# Patient Record
Sex: FEMALE | NOT HISPANIC OR LATINO | URBAN - METROPOLITAN AREA
[De-identification: names, ages, dates, MRNs, and addresses within clinical notes are randomized per-mention and may not be internally consistent; named-entity substitution may affect disease eponyms.]

---

## 2023-07-06 ENCOUNTER — INPATIENT (INPATIENT)
Facility: HOSPITAL | Age: 73
LOS: 1 days | Discharge: HOME | DRG: 563 | End: 2023-07-08
Attending: STUDENT IN AN ORGANIZED HEALTH CARE EDUCATION/TRAINING PROGRAM | Admitting: STUDENT IN AN ORGANIZED HEALTH CARE EDUCATION/TRAINING PROGRAM
Payer: COMMERCIAL

## 2023-07-06 VITALS
RESPIRATION RATE: 20 BRPM | TEMPERATURE: 98 F | SYSTOLIC BLOOD PRESSURE: 144 MMHG | HEART RATE: 72 BPM | WEIGHT: 110.01 LBS | OXYGEN SATURATION: 96 % | HEIGHT: 62 IN | DIASTOLIC BLOOD PRESSURE: 83 MMHG

## 2023-07-06 DIAGNOSIS — S42.109A FRACTURE OF UNSPECIFIED PART OF SCAPULA, UNSPECIFIED SHOULDER, INITIAL ENCOUNTER FOR CLOSED FRACTURE: ICD-10-CM

## 2023-07-06 LAB
ALBUMIN SERPL ELPH-MCNC: 3.8 G/DL — SIGNIFICANT CHANGE UP (ref 3.3–5)
ALP SERPL-CCNC: 48 U/L — SIGNIFICANT CHANGE UP (ref 40–120)
ALT FLD-CCNC: 14 U/L — SIGNIFICANT CHANGE UP (ref 10–45)
ANION GAP SERPL CALC-SCNC: 11 MMOL/L — SIGNIFICANT CHANGE UP (ref 5–17)
APPEARANCE UR: CLEAR — SIGNIFICANT CHANGE UP
APTT BLD: 27.5 SEC — SIGNIFICANT CHANGE UP (ref 27.5–35.5)
AST SERPL-CCNC: 24 U/L — SIGNIFICANT CHANGE UP (ref 10–40)
BASE EXCESS BLDV CALC-SCNC: 1.5 MMOL/L — SIGNIFICANT CHANGE UP (ref -2–3)
BASOPHILS # BLD AUTO: 0.05 K/UL — SIGNIFICANT CHANGE UP (ref 0–0.2)
BASOPHILS NFR BLD AUTO: 0.6 % — SIGNIFICANT CHANGE UP (ref 0–2)
BILIRUB SERPL-MCNC: 0.5 MG/DL — SIGNIFICANT CHANGE UP (ref 0.2–1.2)
BILIRUB UR-MCNC: NEGATIVE — SIGNIFICANT CHANGE UP
BUN SERPL-MCNC: 19 MG/DL — SIGNIFICANT CHANGE UP (ref 7–23)
CA-I SERPL-SCNC: 1.16 MMOL/L — SIGNIFICANT CHANGE UP (ref 1.15–1.33)
CALCIUM SERPL-MCNC: 8.7 MG/DL — SIGNIFICANT CHANGE UP (ref 8.4–10.5)
CHLORIDE BLDV-SCNC: 107 MMOL/L — SIGNIFICANT CHANGE UP (ref 96–108)
CHLORIDE SERPL-SCNC: 106 MMOL/L — SIGNIFICANT CHANGE UP (ref 96–108)
CO2 BLDV-SCNC: 25 MMOL/L — SIGNIFICANT CHANGE UP (ref 22–26)
CO2 SERPL-SCNC: 23 MMOL/L — SIGNIFICANT CHANGE UP (ref 22–31)
COLOR SPEC: SIGNIFICANT CHANGE UP
CREAT SERPL-MCNC: 0.57 MG/DL — SIGNIFICANT CHANGE UP (ref 0.5–1.3)
DIFF PNL FLD: NEGATIVE — SIGNIFICANT CHANGE UP
EGFR: 98 ML/MIN/1.73M2 — SIGNIFICANT CHANGE UP
EOSINOPHIL # BLD AUTO: 0.02 K/UL — SIGNIFICANT CHANGE UP (ref 0–0.5)
EOSINOPHIL NFR BLD AUTO: 0.3 % — SIGNIFICANT CHANGE UP (ref 0–6)
GAS PNL BLDV: 136 MMOL/L — SIGNIFICANT CHANGE UP (ref 136–145)
GAS PNL BLDV: SIGNIFICANT CHANGE UP
GLUCOSE BLDV-MCNC: 104 MG/DL — HIGH (ref 70–99)
GLUCOSE SERPL-MCNC: 112 MG/DL — HIGH (ref 70–99)
GLUCOSE UR QL: NEGATIVE — SIGNIFICANT CHANGE UP
HCO3 BLDV-SCNC: 24 MMOL/L — SIGNIFICANT CHANGE UP (ref 22–29)
HCT VFR BLD CALC: 33.9 % — LOW (ref 34.5–45)
HCT VFR BLDA CALC: 33 % — LOW (ref 34.5–46.5)
HGB BLD CALC-MCNC: 11.1 G/DL — LOW (ref 11.7–16.1)
HGB BLD-MCNC: 11.2 G/DL — LOW (ref 11.5–15.5)
IMM GRANULOCYTES NFR BLD AUTO: 0.8 % — SIGNIFICANT CHANGE UP (ref 0–0.9)
INR BLD: 1.13 RATIO — SIGNIFICANT CHANGE UP (ref 0.88–1.16)
KETONES UR-MCNC: ABNORMAL
LACTATE BLDV-MCNC: 1 MMOL/L — SIGNIFICANT CHANGE UP (ref 0.5–2)
LACTATE SERPL-SCNC: 0.9 MMOL/L — SIGNIFICANT CHANGE UP (ref 0.5–2)
LEUKOCYTE ESTERASE UR-ACNC: NEGATIVE — SIGNIFICANT CHANGE UP
LIDOCAIN IGE QN: 62 U/L — HIGH (ref 7–60)
LYMPHOCYTES # BLD AUTO: 1.21 K/UL — SIGNIFICANT CHANGE UP (ref 1–3.3)
LYMPHOCYTES # BLD AUTO: 15.7 % — SIGNIFICANT CHANGE UP (ref 13–44)
MCHC RBC-ENTMCNC: 31.9 PG — SIGNIFICANT CHANGE UP (ref 27–34)
MCHC RBC-ENTMCNC: 33 GM/DL — SIGNIFICANT CHANGE UP (ref 32–36)
MCV RBC AUTO: 96.6 FL — SIGNIFICANT CHANGE UP (ref 80–100)
MONOCYTES # BLD AUTO: 0.37 K/UL — SIGNIFICANT CHANGE UP (ref 0–0.9)
MONOCYTES NFR BLD AUTO: 4.8 % — SIGNIFICANT CHANGE UP (ref 2–14)
NEUTROPHILS # BLD AUTO: 6 K/UL — SIGNIFICANT CHANGE UP (ref 1.8–7.4)
NEUTROPHILS NFR BLD AUTO: 77.8 % — HIGH (ref 43–77)
NITRITE UR-MCNC: NEGATIVE — SIGNIFICANT CHANGE UP
NRBC # BLD: 0 /100 WBCS — SIGNIFICANT CHANGE UP (ref 0–0)
PCO2 BLDV: 32 MMHG — LOW (ref 39–42)
PH BLDV: 7.49 — HIGH (ref 7.32–7.43)
PH UR: 7.5 — SIGNIFICANT CHANGE UP (ref 5–8)
PLATELET # BLD AUTO: 264 K/UL — SIGNIFICANT CHANGE UP (ref 150–400)
PO2 BLDV: 130 MMHG — HIGH (ref 25–45)
POTASSIUM BLDV-SCNC: 3.8 MMOL/L — SIGNIFICANT CHANGE UP (ref 3.5–5.1)
POTASSIUM SERPL-MCNC: 3.6 MMOL/L — SIGNIFICANT CHANGE UP (ref 3.5–5.3)
POTASSIUM SERPL-SCNC: 3.6 MMOL/L — SIGNIFICANT CHANGE UP (ref 3.5–5.3)
PROT SERPL-MCNC: 6.7 G/DL — SIGNIFICANT CHANGE UP (ref 6–8.3)
PROT UR-MCNC: NEGATIVE — SIGNIFICANT CHANGE UP
PROTHROM AB SERPL-ACNC: 13 SEC — SIGNIFICANT CHANGE UP (ref 10.5–13.4)
RBC # BLD: 3.51 M/UL — LOW (ref 3.8–5.2)
RBC # FLD: 11.8 % — SIGNIFICANT CHANGE UP (ref 10.3–14.5)
SAO2 % BLDV: 99.5 % — HIGH (ref 67–88)
SODIUM SERPL-SCNC: 140 MMOL/L — SIGNIFICANT CHANGE UP (ref 135–145)
SP GR SPEC: 1.04 — HIGH (ref 1.01–1.02)
UROBILINOGEN FLD QL: NEGATIVE — SIGNIFICANT CHANGE UP
WBC # BLD: 7.71 K/UL — SIGNIFICANT CHANGE UP (ref 3.8–10.5)
WBC # FLD AUTO: 7.71 K/UL — SIGNIFICANT CHANGE UP (ref 3.8–10.5)

## 2023-07-06 PROCEDURE — 73200 CT UPPER EXTREMITY W/O DYE: CPT | Mod: 26,LT,MA

## 2023-07-06 PROCEDURE — 70450 CT HEAD/BRAIN W/O DYE: CPT | Mod: 26,MA

## 2023-07-06 PROCEDURE — 71045 X-RAY EXAM CHEST 1 VIEW: CPT | Mod: 26

## 2023-07-06 PROCEDURE — 99222 1ST HOSP IP/OBS MODERATE 55: CPT | Mod: GC

## 2023-07-06 PROCEDURE — 72170 X-RAY EXAM OF PELVIS: CPT | Mod: 26

## 2023-07-06 PROCEDURE — 74177 CT ABD & PELVIS W/CONTRAST: CPT | Mod: 26,MA

## 2023-07-06 PROCEDURE — 71260 CT THORAX DX C+: CPT | Mod: 26,MA

## 2023-07-06 PROCEDURE — 99285 EMERGENCY DEPT VISIT HI MDM: CPT

## 2023-07-06 PROCEDURE — 73060 X-RAY EXAM OF HUMERUS: CPT | Mod: 26,LT

## 2023-07-06 PROCEDURE — 73090 X-RAY EXAM OF FOREARM: CPT | Mod: 26,LT

## 2023-07-06 PROCEDURE — 73110 X-RAY EXAM OF WRIST: CPT | Mod: 26,LT

## 2023-07-06 PROCEDURE — 73080 X-RAY EXAM OF ELBOW: CPT | Mod: 26,LT

## 2023-07-06 PROCEDURE — 76377 3D RENDER W/INTRP POSTPROCES: CPT | Mod: 26

## 2023-07-06 PROCEDURE — 73030 X-RAY EXAM OF SHOULDER: CPT | Mod: 26,LT

## 2023-07-06 RX ORDER — HYDROMORPHONE HYDROCHLORIDE 2 MG/ML
0.5 INJECTION INTRAMUSCULAR; INTRAVENOUS; SUBCUTANEOUS EVERY 4 HOURS
Refills: 0 | Status: DISCONTINUED | OUTPATIENT
Start: 2023-07-06 | End: 2023-07-07

## 2023-07-06 RX ORDER — OXYCODONE HYDROCHLORIDE 5 MG/1
2.5 TABLET ORAL EVERY 4 HOURS
Refills: 0 | Status: DISCONTINUED | OUTPATIENT
Start: 2023-07-06 | End: 2023-07-08

## 2023-07-06 RX ORDER — SODIUM CHLORIDE 9 MG/ML
1000 INJECTION INTRAMUSCULAR; INTRAVENOUS; SUBCUTANEOUS
Refills: 0 | Status: DISCONTINUED | OUTPATIENT
Start: 2023-07-06 | End: 2023-07-07

## 2023-07-06 RX ORDER — OXYCODONE HYDROCHLORIDE 5 MG/1
5 TABLET ORAL EVERY 4 HOURS
Refills: 0 | Status: DISCONTINUED | OUTPATIENT
Start: 2023-07-06 | End: 2023-07-08

## 2023-07-06 RX ORDER — ACETAMINOPHEN 500 MG
1000 TABLET ORAL EVERY 6 HOURS
Refills: 0 | Status: DISCONTINUED | OUTPATIENT
Start: 2023-07-06 | End: 2023-07-08

## 2023-07-06 RX ORDER — ACETAMINOPHEN 500 MG
750 TABLET ORAL ONCE
Refills: 0 | Status: COMPLETED | OUTPATIENT
Start: 2023-07-06 | End: 2023-07-06

## 2023-07-06 RX ORDER — SENNA PLUS 8.6 MG/1
2 TABLET ORAL AT BEDTIME
Refills: 0 | Status: DISCONTINUED | OUTPATIENT
Start: 2023-07-06 | End: 2023-07-08

## 2023-07-06 RX ADMIN — Medication 750 MILLIGRAM(S): at 15:25

## 2023-07-06 RX ADMIN — Medication 1000 MILLIGRAM(S): at 20:30

## 2023-07-06 RX ADMIN — SODIUM CHLORIDE 125 MILLILITER(S): 9 INJECTION INTRAMUSCULAR; INTRAVENOUS; SUBCUTANEOUS at 13:05

## 2023-07-06 RX ADMIN — Medication 300 MILLIGRAM(S): at 13:05

## 2023-07-06 RX ADMIN — Medication 1000 MILLIGRAM(S): at 21:30

## 2023-07-06 NOTE — ED ADULT NURSE NOTE - NSFALLUNIVINTERV_ED_ALL_ED
Bed/Stretcher in lowest position, wheels locked, appropriate side rails in place/Call bell, personal items and telephone in reach/Instruct patient to call for assistance before getting out of bed/chair/stretcher/Non-slip footwear applied when patient is off stretcher/[place] to call system/Physically safe environment - no spills, clutter or unnecessary equipment/Purposeful proactive rounding/Room/bathroom lighting operational, light cord in reach

## 2023-07-06 NOTE — ED PROVIDER NOTE - NSICDXPASTMEDICALHX_GEN_ALL_CORE_FT
PAST MEDICAL HISTORY:  2019 novel coronavirus disease (COVID-19)     COVID-19 vaccine series completed
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
## 2023-07-06 NOTE — ED PROVIDER NOTE - NS ED ATTENDING STATEMENT MOD
Attending with This was a shared visit with the ALIRIO. I reviewed and verified the documentation and independently performed the documented:
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
## 2023-07-06 NOTE — ED CLERICAL - BED REQUESTED
06-Jul-2023 16:40 The Service to Spine order has been removed as we were unable to contact patient.    Please contact patient if further communication is needed.    Thanks
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
## 2023-07-06 NOTE — PATIENT PROFILE ADULT - FALL HARM RISK - HARM RISK INTERVENTIONS
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
## 2023-07-07 ENCOUNTER — TRANSCRIPTION ENCOUNTER (OUTPATIENT)
Age: 73
End: 2023-07-07

## 2023-07-07 LAB
ANION GAP SERPL CALC-SCNC: 12 MMOL/L — SIGNIFICANT CHANGE UP (ref 5–17)
BUN SERPL-MCNC: 14 MG/DL — SIGNIFICANT CHANGE UP (ref 7–23)
CALCIUM SERPL-MCNC: 8.3 MG/DL — LOW (ref 8.4–10.5)
CHLORIDE SERPL-SCNC: 104 MMOL/L — SIGNIFICANT CHANGE UP (ref 96–108)
CO2 SERPL-SCNC: 21 MMOL/L — LOW (ref 22–31)
CREAT SERPL-MCNC: 0.47 MG/DL — LOW (ref 0.5–1.3)
EGFR: 102 ML/MIN/1.73M2 — SIGNIFICANT CHANGE UP
GLUCOSE SERPL-MCNC: 92 MG/DL — SIGNIFICANT CHANGE UP (ref 70–99)
HCT VFR BLD CALC: 31.8 % — LOW (ref 34.5–45)
HGB BLD-MCNC: 10.6 G/DL — LOW (ref 11.5–15.5)
MCHC RBC-ENTMCNC: 32.1 PG — SIGNIFICANT CHANGE UP (ref 27–34)
MCHC RBC-ENTMCNC: 33.3 GM/DL — SIGNIFICANT CHANGE UP (ref 32–36)
MCV RBC AUTO: 96.4 FL — SIGNIFICANT CHANGE UP (ref 80–100)
NRBC # BLD: 0 /100 WBCS — SIGNIFICANT CHANGE UP (ref 0–0)
PLATELET # BLD AUTO: 222 K/UL — SIGNIFICANT CHANGE UP (ref 150–400)
POTASSIUM SERPL-MCNC: 3.7 MMOL/L — SIGNIFICANT CHANGE UP (ref 3.5–5.3)
POTASSIUM SERPL-SCNC: 3.7 MMOL/L — SIGNIFICANT CHANGE UP (ref 3.5–5.3)
RBC # BLD: 3.3 M/UL — LOW (ref 3.8–5.2)
RBC # FLD: 11.8 % — SIGNIFICANT CHANGE UP (ref 10.3–14.5)
SODIUM SERPL-SCNC: 137 MMOL/L — SIGNIFICANT CHANGE UP (ref 135–145)
WBC # BLD: 7.19 K/UL — SIGNIFICANT CHANGE UP (ref 3.8–10.5)
WBC # FLD AUTO: 7.19 K/UL — SIGNIFICANT CHANGE UP (ref 3.8–10.5)

## 2023-07-07 PROCEDURE — 99232 SBSQ HOSP IP/OBS MODERATE 35: CPT | Mod: GC

## 2023-07-07 PROCEDURE — 72125 CT NECK SPINE W/O DYE: CPT | Mod: 26

## 2023-07-07 RX ORDER — IBUPROFEN 200 MG
400 TABLET ORAL EVERY 6 HOURS
Refills: 0 | Status: DISCONTINUED | OUTPATIENT
Start: 2023-07-07 | End: 2023-07-08

## 2023-07-07 RX ORDER — LIDOCAINE 4 G/100G
1 CREAM TOPICAL EVERY 24 HOURS
Refills: 0 | Status: DISCONTINUED | OUTPATIENT
Start: 2023-07-07 | End: 2023-07-08

## 2023-07-07 RX ORDER — ACETAMINOPHEN 500 MG
2 TABLET ORAL
Qty: 0 | Refills: 0 | DISCHARGE
Start: 2023-07-07

## 2023-07-07 RX ORDER — ENOXAPARIN SODIUM 100 MG/ML
40 INJECTION SUBCUTANEOUS EVERY 24 HOURS
Refills: 0 | Status: DISCONTINUED | OUTPATIENT
Start: 2023-07-07 | End: 2023-07-08

## 2023-07-07 RX ORDER — OXYCODONE HYDROCHLORIDE 5 MG/1
1 TABLET ORAL
Qty: 6 | Refills: 0
Start: 2023-07-07

## 2023-07-07 RX ADMIN — Medication 1000 MILLIGRAM(S): at 15:46

## 2023-07-07 RX ADMIN — Medication 400 MILLIGRAM(S): at 18:45

## 2023-07-07 RX ADMIN — ENOXAPARIN SODIUM 40 MILLIGRAM(S): 100 INJECTION SUBCUTANEOUS at 06:11

## 2023-07-07 RX ADMIN — Medication 1000 MILLIGRAM(S): at 08:08

## 2023-07-07 RX ADMIN — Medication 1000 MILLIGRAM(S): at 08:45

## 2023-07-07 RX ADMIN — OXYCODONE HYDROCHLORIDE 5 MILLIGRAM(S): 5 TABLET ORAL at 16:30

## 2023-07-07 RX ADMIN — Medication 1000 MILLIGRAM(S): at 16:30

## 2023-07-07 RX ADMIN — Medication 1000 MILLIGRAM(S): at 20:01

## 2023-07-07 RX ADMIN — OXYCODONE HYDROCHLORIDE 5 MILLIGRAM(S): 5 TABLET ORAL at 15:48

## 2023-07-07 RX ADMIN — LIDOCAINE 1 PATCH: 4 CREAM TOPICAL at 06:11

## 2023-07-07 RX ADMIN — OXYCODONE HYDROCHLORIDE 5 MILLIGRAM(S): 5 TABLET ORAL at 08:08

## 2023-07-07 RX ADMIN — LIDOCAINE 1 PATCH: 4 CREAM TOPICAL at 06:17

## 2023-07-07 RX ADMIN — OXYCODONE HYDROCHLORIDE 5 MILLIGRAM(S): 5 TABLET ORAL at 08:45

## 2023-07-07 RX ADMIN — Medication 400 MILLIGRAM(S): at 23:15

## 2023-07-07 RX ADMIN — Medication 1000 MILLIGRAM(S): at 20:30

## 2023-07-07 RX ADMIN — Medication 400 MILLIGRAM(S): at 22:47
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
## 2023-07-07 NOTE — OCCUPATIONAL THERAPY INITIAL EVALUATION ADULT - RANGE OF MOTION EXAMINATION, UPPER EXTREMITY
LUE difficult to assess 2/2 discomfort and sling/Right UE Active ROM was WFL (within functional limits)

## 2023-07-07 NOTE — OCCUPATIONAL THERAPY INITIAL EVALUATION ADULT - LIVES WITH, PROFILE
Pt states she lives with family in  with no BRIEN. Pt has a walk in shower. Pt states she has a caregiver who assists her 2x a week.

## 2023-07-07 NOTE — DISCHARGE NOTE NURSING/CASE MANAGEMENT/SOCIAL WORK - PATIENT PORTAL LINK FT
You can access the FollowMyHealth Patient Portal offered by [facility] by registering at the following website: [email or web address]. By joining [facility]’s FollowMyHealth portal, you will also be able to view your health information using other applications (apps) compatible with our system.
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
## 2023-07-07 NOTE — PROGRESS NOTE ADULT - SUBJECTIVE AND OBJECTIVE BOX
GENERAL SURGERY DAILY PROGRESS NOTE:     Subjective:              Objective:    MEDICATIONS  (STANDING):  acetaminophen     Tablet .. 1000 milliGRAM(s) Oral every 6 hours  senna 2 Tablet(s) Oral at bedtime  sodium chloride 0.9%. 1000 milliLiter(s) (125 mL/Hr) IV Continuous <Continuous>    MEDICATIONS  (PRN):  HYDROmorphone  Injectable 0.5 milliGRAM(s) IV Push every 4 hours PRN Breakthrough pain  oxyCODONE    IR 2.5 milliGRAM(s) Oral every 4 hours PRN Moderate Pain (4 - 6)  oxyCODONE    IR 5 milliGRAM(s) Oral every 4 hours PRN Severe Pain (7 - 10)      Vital Signs Last 24 Hrs  T(C): 36.8 (2023 04:38), Max: 37 (2023 11:00)  T(F): 98.2 (2023 04:38), Max: 98.6 (2023 11:00)  HR: 70 (2023 04:38) (68 - 75)  BP: 126/71 (2023 04:38) (116/78 - 157/81)  BP(mean): --  RR: 18 (2023 04:38) (17 - 20)  SpO2: 96% (2023 04:38) (95% - 99%)    Parameters below as of 2023 04:38  Patient On (Oxygen Delivery Method): room air        I&O's Detail    2023 07:01  -  2023 05:23  --------------------------------------------------------  IN:    Oral Fluid: 420 mL  Total IN: 420 mL    OUT:    Voided (mL): 200 mL  Total OUT: 200 mL    Total NET: 220 mL          Daily Height in cm: 157.48 (2023 10:25)    Daily     LABS:                        11.2   7.71  )-----------( 264      ( 2023 11:16 )             33.9     07-06    140  |  106  |  19  ----------------------------<  112<H>  3.6   |  23  |  0.57    Ca    8.7      2023 11:16    TPro  6.7  /  Alb  3.8  /  TBili  0.5  /  DBili  x   /  AST  24  /  ALT  14  /  AlkPhos  48  07-06    PT/INR - ( 2023 11:16 )   PT: 13.0 sec;   INR: 1.13 ratio         PTT - ( 2023 11:16 )  PTT:27.5 sec  Urinalysis Basic - ( 2023 14:46 )    Color: Light Yellow / Appearance: Clear / S.038 / pH: x  Gluc: x / Ketone: Small  / Bili: Negative / Urobili: Negative   Blood: x / Protein: Negative / Nitrite: Negative   Leuk Esterase: Negative / RBC: x / WBC x   Sq Epi: x / Non Sq Epi: x / Bacteria: x        RADIOLOGY & ADDITIONAL STUDIES:             GENERAL SURGERY DAILY PROGRESS NOTE    Subjective: Patient seen and examined at bedside, denies chest pain, spine tenderness, SOB, HA, fever, chills, N/v.  patient refusing c-collar, spoken to with  at bedside this morning.      Objective:    MEDICATIONS  (STANDING):  acetaminophen     Tablet .. 1000 milliGRAM(s) Oral every 6 hours  senna 2 Tablet(s) Oral at bedtime  sodium chloride 0.9%. 1000 milliLiter(s) (125 mL/Hr) IV Continuous <Continuous>    MEDICATIONS  (PRN):  HYDROmorphone  Injectable 0.5 milliGRAM(s) IV Push every 4 hours PRN Breakthrough pain  oxyCODONE    IR 2.5 milliGRAM(s) Oral every 4 hours PRN Moderate Pain (4 - 6)  oxyCODONE    IR 5 milliGRAM(s) Oral every 4 hours PRN Severe Pain (7 - 10)      Vital Signs Last 24 Hrs  T(C): 36.8 (2023 04:38), Max: 37 (2023 11:00)  T(F): 98.2 (2023 04:38), Max: 98.6 (2023 11:00)  HR: 70 (2023 04:38) (68 - 75)  BP: 126/71 (2023 04:38) (116/78 - 157/81)  BP(mean): --  RR: 18 (2023 04:38) (17 - 20)  SpO2: 96% (2023 04:38) (95% - 99%)    Parameters below as of 2023 04:38  Patient On (Oxygen Delivery Method): room air    PHYSICAL EXAM:  GENERAL: NAD, lying in bed comfortably  HEENT: CN2-10 intact, 11 limited dt pain. No ttp over cervical spine. Edema/tenderness over L shoulder/upper ribcage  CHEST: nonlabored, no increased WOB. No TTP over ribs aside from L upper ribs laterally.   ABDOMEN: Soft, Nontender, Nondistended. Incision sites clean, dry with no erythema or induration.  EXTREMITIES: Well perfused. No clubbing, cyanosis, or edema.   LUE: sling in place, palp pulses, sensation intact, limited ROM 2/2 pain  NERVOUS SYSTEM:  Alert & Oriented X3      I&O's Detail    2023 07:01  -  2023 05:23  --------------------------------------------------------  IN:    Oral Fluid: 420 mL  Total IN: 420 mL    OUT:    Voided (mL): 200 mL  Total OUT: 200 mL    Total NET: 220 mL          Daily Height in cm: 157.48 (2023 10:25)    Daily     LABS:                        11.2   7.71  )-----------( 264      ( 2023 11:16 )             33.9     07-06    140  |  106  |  19  ----------------------------<  112<H>  3.6   |  23  |  0.57    Ca    8.7      2023 11:16    TPro  6.7  /  Alb  3.8  /  TBili  0.5  /  DBili  x   /  AST  24  /  ALT  14  /  AlkPhos  48  07-06    PT/INR - ( 2023 11:16 )   PT: 13.0 sec;   INR: 1.13 ratio         PTT - ( 2023 11:16 )  PTT:27.5 sec  Urinalysis Basic - ( 2023 14:46 )    Color: Light Yellow / Appearance: Clear / S.038 / pH: x  Gluc: x / Ketone: Small  / Bili: Negative / Urobili: Negative   Blood: x / Protein: Negative / Nitrite: Negative   Leuk Esterase: Negative / RBC: x / WBC x   Sq Epi: x / Non Sq Epi: x / Bacteria: x        RADIOLOGY & ADDITIONAL STUDIES:             GENERAL SURGERY DAILY PROGRESS NOTE    Subjective: Patient seen and examined at bedside, denies chest pain, spine tenderness, SOB, HA, fever, chills, N/v. c-collar placed on morning rounds, spoken to with  at bedside this morning.       Objective:    MEDICATIONS  (STANDING):  acetaminophen     Tablet .. 1000 milliGRAM(s) Oral every 6 hours  senna 2 Tablet(s) Oral at bedtime  sodium chloride 0.9%. 1000 milliLiter(s) (125 mL/Hr) IV Continuous <Continuous>    MEDICATIONS  (PRN):  HYDROmorphone  Injectable 0.5 milliGRAM(s) IV Push every 4 hours PRN Breakthrough pain  oxyCODONE    IR 2.5 milliGRAM(s) Oral every 4 hours PRN Moderate Pain (4 - 6)  oxyCODONE    IR 5 milliGRAM(s) Oral every 4 hours PRN Severe Pain (7 - 10)      Vital Signs Last 24 Hrs  T(C): 36.8 (2023 04:38), Max: 37 (2023 11:00)  T(F): 98.2 (2023 04:38), Max: 98.6 (2023 11:00)  HR: 70 (2023 04:38) (68 - 75)  BP: 126/71 (2023 04:38) (116/78 - 157/81)  BP(mean): --  RR: 18 (2023 04:38) (17 - 20)  SpO2: 96% (2023 04:38) (95% - 99%)    Parameters below as of 2023 04:38  Patient On (Oxygen Delivery Method): room air    PHYSICAL EXAM:  GENERAL: NAD, lying in bed comfortably  HEENT: CN2-10 intact, 11 limited dt pain. No ttp over cervical spine. Edema/tenderness over L shoulder/upper ribcage  CHEST: nonlabored, no increased WOB. No TTP over ribs aside from L upper ribs laterally.   ABDOMEN: Soft, Nontender, Nondistended. Incision sites clean, dry with no erythema or induration.  EXTREMITIES: Well perfused. No clubbing, cyanosis, or edema.   LUE: sling in place, palp pulses, sensation intact, limited ROM 2/2 pain  NERVOUS SYSTEM:  Alert & Oriented X3      I&O's Detail    2023 07:01  -  2023 05:23  --------------------------------------------------------  IN:    Oral Fluid: 420 mL  Total IN: 420 mL    OUT:    Voided (mL): 200 mL  Total OUT: 200 mL    Total NET: 220 mL          Daily Height in cm: 157.48 (2023 10:25)    Daily     LABS:                        11.2   7.71  )-----------( 264      ( 2023 11:16 )             33.9     07-06    140  |  106  |  19  ----------------------------<  112<H>  3.6   |  23  |  0.57    Ca    8.7      2023 11:16    TPro  6.7  /  Alb  3.8  /  TBili  0.5  /  DBili  x   /  AST  24  /  ALT  14  /  AlkPhos  48  07-06    PT/INR - ( 2023 11:16 )   PT: 13.0 sec;   INR: 1.13 ratio         PTT - ( 2023 11:16 )  PTT:27.5 sec  Urinalysis Basic - ( 2023 14:46 )    Color: Light Yellow / Appearance: Clear / S.038 / pH: x  Gluc: x / Ketone: Small  / Bili: Negative / Urobili: Negative   Blood: x / Protein: Negative / Nitrite: Negative   Leuk Esterase: Negative / RBC: x / WBC x   Sq Epi: x / Non Sq Epi: x / Bacteria: x        RADIOLOGY & ADDITIONAL STUDIES:

## 2023-07-07 NOTE — DISCHARGE NOTE PROVIDER - PROVIDER TOKENS
PROVIDER:[TOKEN:[[id number]],FOLLOWUP:[1 week]],PROVIDER:[TOKEN:[[id number]],FOLLOWUP:[Routine]]

## 2023-07-07 NOTE — DISCHARGE NOTE PROVIDER - CARE PROVIDER_API CALL
Brett Grimm  Orthopaedic Trauma  [address], [address]  [place] [address]  Phone: [phone]  Fax: [phone]  Follow Up Time: 1 week    Melita Alvarez  Surgical Critical Care  [address], [address]  [place] [address]  Phone: [phone]  Fax: [phone]  Follow Up Time: Routine
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
## 2023-07-07 NOTE — DISCHARGE NOTE PROVIDER - NSDCFUADDINST_GEN_ALL_CORE_FT
non-weight bearing in left upper extremity (sling for comfort)  Take tylenol and ibuprofen for pain control. Can use lidocaine patch.  Follow up with Dr. Grimm orthopedic surgery in 1-2 weeks for L shoulder.  Follow up with trauma surgery (Dr. Alvarez) as needed.

## 2023-07-07 NOTE — PHYSICAL THERAPY INITIAL EVALUATION ADULT - ACTIVE RANGE OF MOTION EXAMINATION, REHAB EVAL
Right UE Active ROM was WFL (within functional limits)/bilateral  lower extremity Active ROM was WFL (within functional limits) LUE not tested/Right UE Active ROM was WFL (within functional limits)/bilateral  lower extremity Active ROM was WFL (within functional limits)

## 2023-07-07 NOTE — DISCHARGE NOTE PROVIDER - NSDCCPCAREPLAN_GEN_ALL_CORE_FT
PRINCIPAL DISCHARGE DIAGNOSIS  Diagnosis: Closed fracture of scapula, initial encounter  Assessment and Plan of Treatment:       SECONDARY DISCHARGE DIAGNOSES  Diagnosis: Fracture of multiple ribs of left side  Assessment and Plan of Treatment:

## 2023-07-07 NOTE — PHYSICAL THERAPY INITIAL EVALUATION ADULT - RANGE OF MOTION EXAMINATION, REHAB EVAL
Right UE ROM was WFL (within functional limits)/bilateral lower extremity ROM was WFL (within functional limits) LUE not tested/Right UE ROM was WFL (within functional limits)/bilateral lower extremity ROM was WFL (within functional limits)

## 2023-07-07 NOTE — DISCHARGE NOTE NURSING/CASE MANAGEMENT/SOCIAL WORK - NSDCPEFALRISK_GEN_ALL_CORE
For information on Fall & Injury Prevention, visit: [email or web address] OR  [email or web address] OR  https://www.cdc.gov/steadi/patient.html
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
## 2023-07-08 VITALS
DIASTOLIC BLOOD PRESSURE: 76 MMHG | RESPIRATION RATE: 18 BRPM | HEART RATE: 88 BPM | OXYGEN SATURATION: 98 % | TEMPERATURE: 98 F | SYSTOLIC BLOOD PRESSURE: 120 MMHG

## 2023-07-08 RX ADMIN — LIDOCAINE 1 PATCH: 4 CREAM TOPICAL at 05:07

## 2023-07-08 RX ADMIN — Medication 1000 MILLIGRAM(S): at 09:00

## 2023-07-08 RX ADMIN — Medication 400 MILLIGRAM(S): at 12:15

## 2023-07-08 RX ADMIN — Medication 400 MILLIGRAM(S): at 05:42

## 2023-07-08 RX ADMIN — Medication 1000 MILLIGRAM(S): at 08:18

## 2023-07-08 RX ADMIN — Medication 400 MILLIGRAM(S): at 11:41

## 2023-07-08 RX ADMIN — ENOXAPARIN SODIUM 40 MILLIGRAM(S): 100 INJECTION SUBCUTANEOUS at 05:10

## 2023-07-08 RX ADMIN — Medication 400 MILLIGRAM(S): at 05:12
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
## 2023-07-20 PROBLEM — Z00.00 ENCOUNTER FOR PREVENTIVE HEALTH EXAMINATION: Status: ACTIVE | Noted: 2023-07-20

## 2023-07-25 ENCOUNTER — APPOINTMENT (OUTPATIENT)
Dept: ORTHOPEDIC SURGERY | Facility: CLINIC | Age: 73
End: 2023-07-25

## 2023-07-27 ENCOUNTER — APPOINTMENT (OUTPATIENT)
Dept: ORTHOPEDIC SURGERY | Facility: CLINIC | Age: 73
End: 2023-07-27
Payer: COMMERCIAL

## 2023-07-27 VITALS
DIASTOLIC BLOOD PRESSURE: 74 MMHG | HEIGHT: 60 IN | HEART RATE: 80 BPM | WEIGHT: 110 LBS | TEMPERATURE: 98 F | SYSTOLIC BLOOD PRESSURE: 123 MMHG | BODY MASS INDEX: 21.6 KG/M2

## 2023-07-27 DIAGNOSIS — S42.102A FRACTURE OF UNSPECIFIED PART OF SCAPULA, LEFT SHOULDER, INITIAL ENCOUNTER FOR CLOSED FRACTURE: ICD-10-CM

## 2023-07-27 PROCEDURE — 23570 CLTX SCAPULAR FX W/O MNPJ: CPT

## 2023-07-27 PROCEDURE — 73030 X-RAY EXAM OF SHOULDER: CPT | Mod: LT

## 2023-07-27 PROCEDURE — 99203 OFFICE O/P NEW LOW 30 MIN: CPT

## 2023-07-27 NOTE — PHYSICAL EXAM
[de-identified] : Ms. LALITA RENEE is sitting comfortably in the exam room \par LET shoulder\par -Skin is intact, no swelling, no ecchymosis\par -Range of motion limited to pain\par -Able to make a fist\par -Sensation is intact median, radial, ulnar, axillary nerves\par -Motor is intact median, radial, ulnar, axillary nerves\par -Hand is warm and well-perfused, Palpable radial and ulnar pulses  [de-identified] : Xrays of the left shoulder were taken in the office today, 7/27/23\par \par ACC: [id number] EXAM: CT 3D RECONSTRUCT W BERNY ORDERED BY: CHRIS KOENIG\par \par ACC: [id number] EXAM: CT SHOULDER ONLY LT ORDERED BY: CHRIS KOENIG\par \par PROCEDURE DATE: 07/06/2023\par \par INTERPRETATION: HISTORY: Left glenoid fracture\par \par Helical CT imaging of the left shoulder was performed without intravenous contrast. Sagittal and coronal reformats were provided. 3-D reformats were performed on a separate workstation.\par \par Correlation is made with radiograph from same day.\par \par FINDINGS: There is an acute comminuted fracture of the scapula with a comminuted segmental component involving the glenoid and glenoid neck. The glenoid fragments are displaced anteromedially by up to 1.1 cm. There is intra-articular extension along the anterior margin of the glenoid. There is avulsion of the base of the coracoid with the coracoid fragment displaced anteriorly. There is extension of fracture from the level of the glenoid neck through the glenoid body to the level of the medial border. The glenoid body fracture fragments are displaced posteriorly by approximately 1.2 cm. There is no extension to the acromion.\par \par The displaced glenoid articular fragment is moderately subluxed anteriorly relative to the humeral head.\par \par There is mild elevation of the lateral clavicle relative to the acromion consistent with acromioclavicular joint sprain.\par \par  There are acute comminuted displaced fractures of the left third and fourth ribs.\par \par There is intramuscular and myofascial edema about the scapula and extending along the axillary neurovascular planes.\par \par IMPRESSION:\par \par Acute comminuted fracture of the scapula with displacement of the glenoid, avulsion of the coracoid, and extension into the scapular body as outlined above. The glenoid fragment is moderately subluxed anteriorly relative to the humeral head.\par \par Acute comminuted displaced fractures of left third and fourth ribs.\par \par --- End of Report ---\par \par TARA GUZMAN MD; Attending Radiologist\par This document has been electronically signed. Jul 6 2023 5:09PM
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
## 2023-07-27 NOTE — DISCUSSION/SUMMARY
[de-identified] : 72-year-old woman with a left comminuted scapula fracture, approximately 3 weeks out. \par \par -The risks and benefits of operative versus nonoperative management were discussed at length with the patient. The patient shows a good understanding of the injury and treatment options. They would like to move forward with nonoperative management .  A long discussion was held with the patient using an  regarding the severity of the injury and the potential limitations to activities.  The patient is strongly against surgery\par -Nonweightbearing LUE\par -Home exercises: Pendulum exercises were demonstrated in the office today.\par -Physical Therapy: ROM left shoulder\par -Follow-up in 1 month with x-rays of the left shoulder at that time. \par -All of the patient's questions and concerns were addressed.

## 2023-08-30 ENCOUNTER — APPOINTMENT (OUTPATIENT)
Dept: ORTHOPEDIC SURGERY | Facility: CLINIC | Age: 73
End: 2023-08-30

## 2023-09-13 ENCOUNTER — APPOINTMENT (OUTPATIENT)
Dept: ORTHOPEDIC SURGERY | Facility: CLINIC | Age: 73
End: 2023-09-13

## 2024-11-06 NOTE — ED ADULT TRIAGE NOTE - HEIGHT IN INCHES
You have a follow up appointment scheduled for Dec 12 th  please arrive at 7:30 am .    Discharge Instructions:  1.You should take it easy once you are home. Avoid sitting for any prolonged period of time (no longer than one hour at a time without standing up and stretching).    2. If you are uncomfortable, you may apply ice to the injection site, 20 min on, 1hr off.    3. Do not drive a vehicle or heavy machinery after the procedure.    4. You may resume your normal activity the day after the procedure. Remember that it can take up to 10 days before you begin to feel the effects of the steroid.    5. Do gentle stretches thru out the day.    6. You may restart all medications after the procedure, unless noted elsewhere.    7. Be aware that if you are diabetic your blood sugar could increase due to the steroid, follow up with your primary doctor if blood sugar greater than 300.        The pain clinic phone number is [phone].        You will always get our voice mail when you call, please leave a message.   Please leave your name, date of birth and phone number on the voicemail to ensure we can identify you.    The pain clinic will return your call within 2 business days.  
2